# Patient Record
Sex: FEMALE
[De-identification: names, ages, dates, MRNs, and addresses within clinical notes are randomized per-mention and may not be internally consistent; named-entity substitution may affect disease eponyms.]

---

## 2017-11-17 ENCOUNTER — OTHER (OUTPATIENT)
Age: 37
End: 2017-11-17

## 2021-10-22 ENCOUNTER — APPOINTMENT (OUTPATIENT)
Dept: BARIATRICS | Facility: CLINIC | Age: 41
End: 2021-10-22
Payer: COMMERCIAL

## 2021-10-22 VITALS — WEIGHT: 175 LBS

## 2021-10-22 DIAGNOSIS — Z98.84 BARIATRIC SURGERY STATUS: ICD-10-CM

## 2021-10-22 DIAGNOSIS — K91.2 POSTSURGICAL MALABSORPTION, NOT ELSEWHERE CLASSIFIED: ICD-10-CM

## 2021-10-22 PROCEDURE — 99213 OFFICE O/P EST LOW 20 MIN: CPT | Mod: 95

## 2021-10-22 NOTE — HISTORY OF PRESENT ILLNESS
[de-identified] : Ailyn Lanza, patient came back post-op, Patient weight is high is 275 lbs. She had a surgery done in 2013 and is worried about the weight gain and has been really tired. I believe the weight gain is due to the food she eats. Patient reports abdominal swelling. It appears to be excess fat and to help with the extra skin going back to a strict diet. If patient would like she can go see a plastic surgeon. Her lab works all came back normal and discussed with patient the importance of eating vegetables and dieting. Discussed with patient to follow up with dietitian and .  Answered all patient's questions.

## 2021-10-22 NOTE — ASSESSMENT
[FreeTextEntry1] : Discussed with patient to follow up with dietitian and  and to continue diet and exercising.

## 2024-01-22 ENCOUNTER — OFFICE (OUTPATIENT)
Dept: URBAN - METROPOLITAN AREA CLINIC 8 | Facility: CLINIC | Age: 44
Setting detail: OPHTHALMOLOGY
End: 2024-01-22

## 2024-01-22 DIAGNOSIS — T15.11XA: ICD-10-CM

## 2024-01-22 PROBLEM — S05.01XA CORNEAL ABRASION; RIGHT EYE INITIAL ENCOUNTER: Status: ACTIVE | Noted: 2024-01-22

## 2024-01-22 PROCEDURE — 65210 REMOVE FOREIGN BODY FROM EYE: CPT | Mod: RT | Performed by: OPHTHALMOLOGY

## 2024-01-22 ASSESSMENT — CONFRONTATIONAL VISUAL FIELD TEST (CVF)
OD_FINDINGS: FULL
OS_FINDINGS: FULL

## 2024-01-22 ASSESSMENT — CORNEAL TRAUMA - ABRASION: OD_ABRASION: PRESENT

## 2024-04-26 ENCOUNTER — OFFICE (OUTPATIENT)
Dept: URBAN - METROPOLITAN AREA CLINIC 8 | Facility: CLINIC | Age: 44
Setting detail: OPHTHALMOLOGY
End: 2024-04-26
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
## 2024-04-26 PROCEDURE — NO SHOW FE NO SHOW FEE: Performed by: OPTOMETRIST

## 2024-08-29 ENCOUNTER — OFFICE (OUTPATIENT)
Dept: URBAN - METROPOLITAN AREA CLINIC 38 | Facility: CLINIC | Age: 44
Setting detail: OPHTHALMOLOGY
End: 2024-08-29

## 2024-08-29 DIAGNOSIS — S05.01XA: ICD-10-CM

## 2024-08-29 PROCEDURE — 99213 OFFICE O/P EST LOW 20 MIN: CPT | Performed by: OPHTHALMOLOGY

## 2024-08-29 ASSESSMENT — CONFRONTATIONAL VISUAL FIELD TEST (CVF)
OS_FINDINGS: FULL
OD_FINDINGS: FULL

## 2024-08-29 ASSESSMENT — LID EXAM ASSESSMENTS: OD_COMMENTS: EUL - NO FB
